# Patient Record
Sex: FEMALE | Race: WHITE | URBAN - METROPOLITAN AREA
[De-identification: names, ages, dates, MRNs, and addresses within clinical notes are randomized per-mention and may not be internally consistent; named-entity substitution may affect disease eponyms.]

---

## 2017-10-03 ENCOUNTER — APPOINTMENT (RX ONLY)
Dept: URBAN - METROPOLITAN AREA OTHER 10 | Facility: OTHER | Age: 47
Setting detail: DERMATOLOGY
End: 2017-10-03

## 2017-10-03 DIAGNOSIS — B36.0 PITYRIASIS VERSICOLOR: ICD-10-CM

## 2017-10-03 DIAGNOSIS — L81.1 CHLOASMA: ICD-10-CM

## 2017-10-03 PROCEDURE — ? TREATMENT REGIMEN

## 2017-10-03 PROCEDURE — ? COUNSELING

## 2017-10-03 PROCEDURE — ? PRESCRIPTION

## 2017-10-03 PROCEDURE — 99202 OFFICE O/P NEW SF 15 MIN: CPT

## 2017-10-03 RX ORDER — FLUOCINOLONE ACETONIDE, HYDROQUINONE, AND TRETINOIN .1; 40; .5 MG/G; MG/G; MG/G
CREAM TOPICAL QD
Qty: 30 | Refills: 1 | Status: ERX | COMMUNITY
Start: 2017-10-03

## 2017-10-03 RX ORDER — KETOCONAZOLE 2 G/100G
AEROSOL, FOAM TOPICAL
Qty: 50 | Refills: 0 | Status: ERX | COMMUNITY
Start: 2017-10-03

## 2017-10-03 RX ORDER — FLUCONAZOLE 150 MG/1
2 TABLET ORAL
Qty: 4 | Refills: 0 | Status: ERX | COMMUNITY
Start: 2017-10-03

## 2017-10-03 RX ADMIN — FLUCONAZOLE 2: 150 TABLET ORAL at 18:18

## 2017-10-03 RX ADMIN — FLUOCINOLONE ACETONIDE, HYDROQUINONE, AND TRETINOIN: .1; 40; .5 CREAM TOPICAL at 18:27

## 2017-10-03 RX ADMIN — KETOCONAZOLE: 2 AEROSOL, FOAM TOPICAL at 18:18

## 2017-10-03 ASSESSMENT — LOCATION DETAILED DESCRIPTION DERM
LOCATION DETAILED: RIGHT ANTERIOR SHOULDER
LOCATION DETAILED: RIGHT SUPERIOR MEDIAL UPPER BACK
LOCATION DETAILED: LEFT ELBOW
LOCATION DETAILED: RIGHT SUPERIOR MEDIAL LOWER BACK
LOCATION DETAILED: RIGHT MEDIAL UPPER BACK

## 2017-10-03 ASSESSMENT — LOCATION SIMPLE DESCRIPTION DERM
LOCATION SIMPLE: LEFT ELBOW
LOCATION SIMPLE: RIGHT UPPER BACK
LOCATION SIMPLE: RIGHT LOWER BACK
LOCATION SIMPLE: RIGHT SHOULDER

## 2017-10-03 ASSESSMENT — LOCATION ZONE DERM
LOCATION ZONE: ARM
LOCATION ZONE: TRUNK

## 2017-10-03 NOTE — HPI: SKIN LESIONS
How Severe Is Your Skin Lesion?: moderate
Have Your Skin Lesions Been Treated?: been treated
Is This A New Presentation, Or A Follow-Up?: Skin Lesions
Additional History: New patient focused visit

## 2017-10-03 NOTE — PROCEDURE: TREATMENT REGIMEN
Initiate Treatment: Diflucan: are two pills a week for two weeks\\nKetoconazole: apply to affected area once a day for up to one to two weeks
Detail Level: Zone
Initiate Treatment: Tri-melanie: apply to discolored skin daily up to three months then discontinue

## 2017-10-27 ENCOUNTER — APPOINTMENT (RX ONLY)
Dept: URBAN - METROPOLITAN AREA OTHER 10 | Facility: OTHER | Age: 47
Setting detail: DERMATOLOGY
End: 2017-10-27

## 2017-10-27 DIAGNOSIS — Z41.9 ENCOUNTER FOR PROCEDURE FOR PURPOSES OTHER THAN REMEDYING HEALTH STATE, UNSPECIFIED: ICD-10-CM

## 2017-10-27 PROCEDURE — ? BOTOX

## 2020-12-10 ENCOUNTER — APPOINTMENT (RX ONLY)
Dept: URBAN - METROPOLITAN AREA OTHER 10 | Facility: OTHER | Age: 50
Setting detail: DERMATOLOGY
End: 2020-12-10

## 2020-12-10 DIAGNOSIS — Z41.9 ENCOUNTER FOR PROCEDURE FOR PURPOSES OTHER THAN REMEDYING HEALTH STATE, UNSPECIFIED: ICD-10-CM

## 2020-12-10 PROCEDURE — ? BOTOX

## 2020-12-10 ASSESSMENT — LOCATION DETAILED DESCRIPTION DERM
LOCATION DETAILED: LEFT FOREHEAD
LOCATION DETAILED: LEFT INFERIOR MEDIAL FOREHEAD
LOCATION DETAILED: SUPERIOR MID FOREHEAD
LOCATION DETAILED: GLABELLA
LOCATION DETAILED: RIGHT MEDIAL EYEBROW
LOCATION DETAILED: RIGHT FOREHEAD
LOCATION DETAILED: RIGHT INFERIOR FOREHEAD

## 2020-12-10 ASSESSMENT — LOCATION SIMPLE DESCRIPTION DERM
LOCATION SIMPLE: SUPERIOR FOREHEAD
LOCATION SIMPLE: LEFT FOREHEAD
LOCATION SIMPLE: RIGHT FOREHEAD
LOCATION SIMPLE: RIGHT EYEBROW
LOCATION SIMPLE: GLABELLA

## 2020-12-10 ASSESSMENT — LOCATION ZONE DERM: LOCATION ZONE: FACE

## 2020-12-10 NOTE — PROCEDURE: BOTOX
Reconstitution Date (Optional): 12/05/2020
Additional Area 4 Units: 0
Additional Area 1 Location: Superior Orbicularis Oris
Consent: Written consent obtained. Risks include but not limited to lid/brow ptosis, bruising, swelling, diplopia, temporary effect, incomplete chemical denervation.
Dilution (U/0.1 Cc): 4
Post-Care Instructions: Patient instructed to not lie down for 4 hours and limit physical activity for 24 hours. Patient instructed not to travel by airplane for 48 hours.
Glabellar Complex Units: 12
Price (Use Numbers Only, No Special Characters Or $): 978
Detail Level: Simple
Lot #: [id number]
Additional Area 2 Location: [facility]
Expiration Date (Month Year): 08/2023
Forehead Units: 6